# Patient Record
Sex: FEMALE | Race: BLACK OR AFRICAN AMERICAN | NOT HISPANIC OR LATINO | URBAN - METROPOLITAN AREA
[De-identification: names, ages, dates, MRNs, and addresses within clinical notes are randomized per-mention and may not be internally consistent; named-entity substitution may affect disease eponyms.]

---

## 2023-03-01 ENCOUNTER — AMBULATORY SURGICAL CENTER (OUTPATIENT)
Dept: URBAN - METROPOLITAN AREA SURGERY 1 | Facility: SURGERY | Age: 63
End: 2023-03-01
Payer: MEDICARE

## 2023-03-01 ENCOUNTER — OFFICE (OUTPATIENT)
Dept: URBAN - METROPOLITAN AREA PATHOLOGY 21 | Facility: PATHOLOGY | Age: 63
End: 2023-03-01
Payer: MEDICARE

## 2023-03-01 VITALS
SYSTOLIC BLOOD PRESSURE: 148 MMHG | SYSTOLIC BLOOD PRESSURE: 92 MMHG | WEIGHT: 177.8 LBS | DIASTOLIC BLOOD PRESSURE: 71 MMHG | RESPIRATION RATE: 20 BRPM | TEMPERATURE: 97.7 F | HEART RATE: 60 BPM | DIASTOLIC BLOOD PRESSURE: 71 MMHG | OXYGEN SATURATION: 98 % | OXYGEN SATURATION: 99 % | RESPIRATION RATE: 20 BRPM | SYSTOLIC BLOOD PRESSURE: 114 MMHG | HEART RATE: 64 BPM | HEART RATE: 60 BPM | SYSTOLIC BLOOD PRESSURE: 118 MMHG | DIASTOLIC BLOOD PRESSURE: 88 MMHG | HEART RATE: 61 BPM | SYSTOLIC BLOOD PRESSURE: 114 MMHG | DIASTOLIC BLOOD PRESSURE: 88 MMHG | SYSTOLIC BLOOD PRESSURE: 92 MMHG | SYSTOLIC BLOOD PRESSURE: 118 MMHG | WEIGHT: 177.8 LBS | HEART RATE: 64 BPM | TEMPERATURE: 97 F | HEART RATE: 80 BPM | HEIGHT: 67 IN | HEART RATE: 80 BPM | DIASTOLIC BLOOD PRESSURE: 76 MMHG | OXYGEN SATURATION: 97 % | DIASTOLIC BLOOD PRESSURE: 75 MMHG | HEART RATE: 61 BPM | OXYGEN SATURATION: 93 % | OXYGEN SATURATION: 97 % | DIASTOLIC BLOOD PRESSURE: 56 MMHG | OXYGEN SATURATION: 93 % | RESPIRATION RATE: 18 BRPM | RESPIRATION RATE: 16 BRPM | DIASTOLIC BLOOD PRESSURE: 75 MMHG | SYSTOLIC BLOOD PRESSURE: 108 MMHG | DIASTOLIC BLOOD PRESSURE: 76 MMHG | TEMPERATURE: 97.7 F | DIASTOLIC BLOOD PRESSURE: 56 MMHG | SYSTOLIC BLOOD PRESSURE: 108 MMHG | OXYGEN SATURATION: 99 % | OXYGEN SATURATION: 98 % | RESPIRATION RATE: 18 BRPM | RESPIRATION RATE: 16 BRPM | HEIGHT: 67 IN | SYSTOLIC BLOOD PRESSURE: 148 MMHG | TEMPERATURE: 97 F

## 2023-03-01 DIAGNOSIS — Z12.11 ENCOUNTER FOR SCREENING FOR MALIGNANT NEOPLASM OF COLON: ICD-10-CM

## 2023-03-01 DIAGNOSIS — K63.5 POLYP OF COLON: ICD-10-CM

## 2023-03-01 DIAGNOSIS — K64.1 SECOND DEGREE HEMORRHOIDS: ICD-10-CM

## 2023-03-01 PROCEDURE — 88305 TISSUE EXAM BY PATHOLOGIST: CPT | Performed by: STUDENT IN AN ORGANIZED HEALTH CARE EDUCATION/TRAINING PROGRAM

## 2023-03-01 PROCEDURE — 45380 COLONOSCOPY AND BIOPSY: CPT | Mod: PT | Performed by: INTERNAL MEDICINE

## 2024-08-14 ENCOUNTER — OFFICE (OUTPATIENT)
Dept: URBAN - METROPOLITAN AREA CLINIC 10 | Facility: CLINIC | Age: 64
End: 2024-08-14
Payer: MEDICARE

## 2024-08-14 VITALS
HEART RATE: 54 BPM | HEIGHT: 67 IN | SYSTOLIC BLOOD PRESSURE: 122 MMHG | WEIGHT: 171 LBS | DIASTOLIC BLOOD PRESSURE: 85 MMHG | OXYGEN SATURATION: 94 %

## 2024-08-14 DIAGNOSIS — K59.09 OTHER CONSTIPATION: ICD-10-CM

## 2024-08-14 DIAGNOSIS — R10.30 LOWER ABDOMINAL PAIN, UNSPECIFIED: ICD-10-CM

## 2024-08-14 PROCEDURE — 99214 OFFICE O/P EST MOD 30 MIN: CPT | Performed by: NURSE PRACTITIONER

## 2024-08-14 RX ORDER — LINACLOTIDE 145 UG/1
CAPSULE, GELATIN COATED ORAL
Qty: 90 | Refills: 3 | Status: ACTIVE

## 2024-12-16 ENCOUNTER — OFFICE (OUTPATIENT)
Dept: URBAN - METROPOLITAN AREA CLINIC 10 | Facility: CLINIC | Age: 64
End: 2024-12-16
Payer: MEDICARE

## 2024-12-16 VITALS
DIASTOLIC BLOOD PRESSURE: 88 MMHG | HEIGHT: 67 IN | SYSTOLIC BLOOD PRESSURE: 131 MMHG | OXYGEN SATURATION: 96 % | HEART RATE: 68 BPM | WEIGHT: 172 LBS

## 2024-12-16 DIAGNOSIS — R10.30 LOWER ABDOMINAL PAIN, UNSPECIFIED: ICD-10-CM

## 2024-12-16 DIAGNOSIS — K59.09 OTHER CONSTIPATION: ICD-10-CM

## 2024-12-16 PROCEDURE — 99214 OFFICE O/P EST MOD 30 MIN: CPT | Performed by: NURSE PRACTITIONER

## 2024-12-16 RX ORDER — DICYCLOMINE HYDROCHLORIDE 10 MG/1
CAPSULE ORAL
Qty: 30 | Refills: 3 | Status: ACTIVE
Start: 2024-12-16

## 2024-12-16 RX ORDER — LINACLOTIDE 290 UG/1
CAPSULE, GELATIN COATED ORAL
Qty: 90 | Refills: 3 | Status: ACTIVE
Start: 2024-12-16

## 2024-12-16 NOTE — SERVICEHPINOTES
Ms. Landry is 64 years old.The patient, with a history of positive Hepatitis C antibody at an outside clinic, presents with persistent abdominal pain that has been ongoing for approximately two to three months. The pain is localized to the right upper quadrant and is described as a piercing sensation. The patient denies any relief from bowel movements and reports no blood in the stool. She has been taking Linzess twice daily for symptom management, which was the only way she was able to have bowel movements. However, the abdominal pain persisted despite this treatment.The patient also reports a sensation of bloating and gas build-up, which she describes as feeling like 'a lot of built up gas.' She has been taking Gas-X sporadically, usually once in the morning, to manage this symptom.In addition to the abdominal pain, the patient also reports a history of nerve pain following a work-related accident. She describes this pain as a severe piercing sensation, similar to the abdominal pain. She has been managing this pain with gabapentin and meloxicam.The patient has had multiple imaging studies, including an ultrasound and a CT scan, which reportedly showed no abnormalities. She has also been evaluated for a possible hernia, but no hernia was identified. The patient has a history of two pregnancies, both delivered via , and a tubal ligation. She denies any other abdominal surgeries.The patient's abdominal pain is constant and present throughout the day, worsening when lying on the left side. She has been using a heating pad and pain patches for symptom management, but reports no relief. The patient expresses frustration with the ongoing pain and the lack of a clear diagnosis.Referral records reviewed. Records indicate that she has Hepatitis C, anogenital warts.2023 colonoscopy for screening with 5 mm Hyperplastic polyp in sigmoid colon grade 2 internal and external hemorrhoids.2012 colonoscopy for screening with grade 2 internal hemorrhoids no visible neoplasia.

## 2025-06-16 ENCOUNTER — OFFICE (OUTPATIENT)
Dept: URBAN - METROPOLITAN AREA CLINIC 10 | Facility: CLINIC | Age: 65
End: 2025-06-16
Payer: MEDICARE

## 2025-06-16 VITALS
HEART RATE: 63 BPM | HEIGHT: 66 IN | WEIGHT: 173 LBS | SYSTOLIC BLOOD PRESSURE: 131 MMHG | DIASTOLIC BLOOD PRESSURE: 85 MMHG | OXYGEN SATURATION: 98 %

## 2025-06-16 DIAGNOSIS — K58.1 IRRITABLE BOWEL SYNDROME WITH CONSTIPATION: ICD-10-CM

## 2025-06-16 DIAGNOSIS — R76.8 OTHER SPECIFIED ABNORMAL IMMUNOLOGICAL FINDINGS IN SERUM: ICD-10-CM

## 2025-06-16 DIAGNOSIS — R10.84 GENERALIZED ABDOMINAL PAIN: ICD-10-CM

## 2025-06-16 PROCEDURE — 99214 OFFICE O/P EST MOD 30 MIN: CPT | Performed by: NURSE PRACTITIONER

## 2025-06-16 RX ORDER — DICYCLOMINE HYDROCHLORIDE 10 MG/1
CAPSULE ORAL
Qty: 90 | Refills: 2 | Status: ACTIVE
Start: 2025-06-16

## 2025-06-16 RX ORDER — LINACLOTIDE 290 UG/1
CAPSULE, GELATIN COATED ORAL
Qty: 90 | Refills: 3 | Status: ACTIVE
Start: 2024-12-16

## 2025-06-16 NOTE — SERVICENOTES
after consent was obtained portions of the chart notes were captured with MITUL reis.  She is requesting repeat labs for hepatitis-C and herpes. we will try scheduled dicyclomine as there was some improvement with PRN use.

## 2025-06-16 NOTE — SERVICEHPINOTES
Ms. Landry is 65 years old.  She is here today for follow up IBS with constipation, abdominal pain. She experiences persistent left-sided abdominal pain. The pain is deep, constant, and non-radiating, present all day without relief from specific actions. No exacerbating factors are identified. She noted  Some improvement with dicyclomine taken as needed but ran out.  Notably, she is not taking amitriptyline at this time where as she was in December when I last saw her.  She was having chronic abdominal pain at that time as well.She takes Linzess 290 mcg once daily and an "IBS tablet" taken twice daily (sounds like IBGard), resulting in frequent bowel movements, sometimes up to three times a day. Bowel movements are occasionally diarrheal. Despite medication, the abdominal pain persists. No blood in stool. No nausea or vomiting. No hematemesis. No dysphagia, odynophagia. She is up a few pounds in weight over the past year.A colonoscopy in March 2023 revealed polyps on the left side, which were removed, but the pain continues. She has not undergone abdominal surgeries, only natural childbirths and a tubal ligation.03/2023 colonoscopy for screening with 5 mm Hyperplastic polyp in sigmoid colon grade 2 internal and external hemorrhoids.09/2012 colonoscopy for screening with grade 2 internal hemorrhoids no visible neoplasia.

## 2025-07-14 ENCOUNTER — OFFICE (OUTPATIENT)
Dept: URBAN - METROPOLITAN AREA CLINIC 10 | Facility: CLINIC | Age: 65
End: 2025-07-14
Payer: MEDICARE

## 2025-07-14 VITALS
SYSTOLIC BLOOD PRESSURE: 118 MMHG | HEART RATE: 65 BPM | DIASTOLIC BLOOD PRESSURE: 70 MMHG | WEIGHT: 175 LBS | OXYGEN SATURATION: 97 % | HEIGHT: 66 IN

## 2025-07-14 DIAGNOSIS — R10.84 GENERALIZED ABDOMINAL PAIN: ICD-10-CM

## 2025-07-14 DIAGNOSIS — K58.1 IRRITABLE BOWEL SYNDROME WITH CONSTIPATION: ICD-10-CM

## 2025-07-14 PROCEDURE — 99214 OFFICE O/P EST MOD 30 MIN: CPT | Performed by: NURSE PRACTITIONER

## 2025-07-14 RX ORDER — DICYCLOMINE HYDROCHLORIDE 10 MG/1
CAPSULE ORAL
Qty: 90 | Refills: 2 | Status: ACTIVE
Start: 2025-06-16

## 2025-07-14 RX ORDER — LINACLOTIDE 290 UG/1
CAPSULE, GELATIN COATED ORAL
Qty: 90 | Refills: 3 | Status: ACTIVE
Start: 2025-07-14

## 2025-07-14 NOTE — SERVICEHPINOTES
Ms. Landry is 65 years old. She is here today for follow up IBS with constipation, abdominal pain.She experiences a persistent nagging sensation in the lower abdomen that does not improve significantly with bowel movements. Despite previous removal of polyps, the pain continues. She has taken dicyclomine in the past for this pain with relief but is currently out of her prescription. She manages the discomfort by keeping the area "tight."She takes Linzess 290mcg and Ibsrela 50mcg daily for constipation. Despite these medications, she has bowel movements only once a day.A colonoscopy in March 2023 revealed polyps on the left side, which were removed, but the pain continues. She has not undergone abdominal surgeries, only natural childbirths and a tubal ligation.06/2025 HCvAb IgG nonreactive HCV RNA not detected HSV [id number] colonoscopy for screening with 5 mm Hyperplastic polyp in sigmoid colon grade 2 internal and external hemorrhoids.09/2012 colonoscopy for screening with grade 2 internal hemorrhoids no visible neoplasia.